# Patient Record
Sex: FEMALE | URBAN - METROPOLITAN AREA
[De-identification: names, ages, dates, MRNs, and addresses within clinical notes are randomized per-mention and may not be internally consistent; named-entity substitution may affect disease eponyms.]

---

## 2017-11-02 ENCOUNTER — EMERGENCY (EMERGENCY)
Facility: HOSPITAL | Age: 67
LOS: 1 days | Discharge: LEFT BEFORE DISCHARGE | End: 2017-11-02
Admitting: EMERGENCY MEDICINE

## 2017-11-02 VITALS
TEMPERATURE: 98 F | DIASTOLIC BLOOD PRESSURE: 68 MMHG | OXYGEN SATURATION: 98 % | HEART RATE: 65 BPM | RESPIRATION RATE: 18 BRPM | SYSTOLIC BLOOD PRESSURE: 143 MMHG

## 2017-11-02 NOTE — ED ADULT NURSE NOTE - CHIEF COMPLAINT QUOTE
[place] from [place] on Monday, c/o intermittent cough, chest pain/SOB when laying flat x1 week.  PMH- Asthma, HTN, DM, Arthritis. pt appears comfortable in NAD. denies any chest pain/SOB at this time. EKG to be completed in triage. FS=91.

## 2021-09-13 NOTE — ED ADULT NURSE NOTE - CHIEF COMPLAINT
The patient is a 67y Female complaining of

## 2023-01-07 NOTE — ED ADULT TRIAGE NOTE - CHIEF COMPLAINT QUOTE
[place] on Monday, c/o intermittent cough, chest pain/SOB when laying flat x1 week.  PMH- Asthma, HTN, DM, Arthritis. pt appears comfortable in NAD. denies any chest pain/SOB at this time. EKG to be completed in triage. FS=91.
no